# Patient Record
Sex: FEMALE | Race: BLACK OR AFRICAN AMERICAN
[De-identification: names, ages, dates, MRNs, and addresses within clinical notes are randomized per-mention and may not be internally consistent; named-entity substitution may affect disease eponyms.]

---

## 2017-03-20 ENCOUNTER — HOSPITAL ENCOUNTER (EMERGENCY)
Dept: HOSPITAL 74 - JER | Age: 52
Discharge: HOME | End: 2017-03-20
Payer: COMMERCIAL

## 2017-03-20 VITALS — DIASTOLIC BLOOD PRESSURE: 106 MMHG | SYSTOLIC BLOOD PRESSURE: 151 MMHG | HEART RATE: 104 BPM

## 2017-03-20 VITALS — BODY MASS INDEX: 30.2 KG/M2

## 2017-03-20 VITALS — TEMPERATURE: 97.8 F

## 2017-03-20 DIAGNOSIS — J45.901: Primary | ICD-10-CM

## 2017-03-20 LAB
ANION GAP SERPL CALC-SCNC: 8 MMOL/L (ref 8–16)
APPEARANCE UR: CLEAR
BASOPHILS # BLD: 1.1 % (ref 0–2)
BILIRUB UR STRIP.AUTO-MCNC: NEGATIVE MG/DL
CALCIUM SERPL-MCNC: 8.3 MG/DL (ref 8.5–10.1)
CO2 SERPL-SCNC: 24 MMOL/L (ref 21–32)
COLOR UR: YELLOW
CREAT SERPL-MCNC: 0.6 MG/DL (ref 0.55–1.02)
DEPRECATED RDW RBC AUTO: 15.1 % (ref 11.6–15.6)
EOSINOPHIL # BLD: 5.3 % (ref 0–4.5)
GLUCOSE SERPL-MCNC: 94 MG/DL (ref 74–106)
KETONES UR QL STRIP: NEGATIVE
LEUKOCYTE ESTERASE UR QL STRIP.AUTO: NEGATIVE
MAGNESIUM SERPL-MCNC: 1.9 MG/DL (ref 1.8–2.4)
MCH RBC QN AUTO: 26.4 PG (ref 25.7–33.7)
MCHC RBC AUTO-ENTMCNC: 32.5 G/DL (ref 32–36)
MCV RBC: 81.2 FL (ref 80–96)
MUCOUS THREADS URNS QL MICRO: (no result)
NEUTROPHILS # BLD: 38.8 % (ref 42.8–82.8)
NITRITE UR QL STRIP: NEGATIVE
PH UR: 5 [PH] (ref 5–8)
PHOSPHATE SERPL-MCNC: 3.5 MG/DL (ref 2.5–4.9)
PLATELET # BLD AUTO: 293 K/MM3 (ref 134–434)
PMV BLD: 8.4 FL (ref 7.5–11.1)
PROT UR QL STRIP: (no result)
PROT UR QL STRIP: NEGATIVE
RBC # BLD AUTO: 2 /HPF (ref 0–3)
RBC # UR STRIP: NEGATIVE /UL
SP GR UR: 1.02 (ref 1–1.03)
TROPONIN I SERPL-MCNC: < 0.02 NG/ML (ref 0–0.05)
UROBILINOGEN UR STRIP-MCNC: NEGATIVE E.U./DL (ref 0.2–1)
WBC # BLD AUTO: 7.6 K/MM3 (ref 4–10)
WBC # UR AUTO: 1 /HPF (ref 3–5)

## 2017-03-20 NOTE — PDOC
History of Present Illness





- General


History Source: Patient, Old Records


Exam Limitations: No Limitations





<Ning Mendez - Last Filed: 03/20/17 10:39>





- General


History Source: Patient


Exam Limitations: No Limitations





- History of Present Illness


Initial Comments: 


03/20/17 08:57


The patient is a 51-year-old woman with a significant past medical history of 

asthma (requiting past intubation; last when she was age of 16; last 

hospitalization for asthma was approximately 3 years ago; no history of CPAP or 

BiPAP usage) who presents to the emergency department via walk-in for further 

evaluation of  shortness of breath. Patient was noted to be 100 % on room air 

with a respiration rate of 34 and hear rate of 104. She was immediately placed 

on a Nebulizer treatment (overall 2 treatments) which provided her significant 

relief. As per patient, her symptoms started approximately 2 weeks ago, when 

she went to her PMD's office for a non-productive cough. She was ultimately 

placed on Z-RENEE and tapering Prednisone. She completed this and felt much 

better. However, she reports feeing increasingly short of breath for the past 

week. She reports endorsing an intermittent productive cough with yellow phlegm 

with associated chills and shortness of breath. She reports using her machine 

at home, all week, which provided little-no relief. She also reports an episode 

of emesis, but states that this is secondary to her persistent coughing. She 

also reports associated chest pain and headache, but attributes this to her 

typical asthmatic symptoms. 





No fever, generalized weakness.


No rhinorrhea, nasal congestion, orthopnea.


No abdominal pain, diarrhea, constipation.


No lightheadedness, dizziness, visual changes, neck pain, jaw pain, leg pain/

swelling, calf tenderness.





Allergies: No Known Drug Allergies


Past Surgical History: Tummy tuck


Social History: No tobacco, ETOH and recreational drug use.





<Yessy Brock - Last Filed: 03/20/17 10:47>





- General


Chief Complaint: Asthma


Stated Complaint: WHEEZING, COUGH (ASTHMA)


Time Seen by Provider: 03/20/17 08:57





Past History





- Past Medical History


Asthma: Yes (INTUBATED)


Suicide Attempt (Hx): No





- Surgical History


Abdominal Surgery:  (TUMMY TUCK)





- Immunization History


Immunization Up to Date: Yes





- Psycho/Social/Smoking Cessation Hx


Anxiety: No


Suicidal Ideation: No


Smoking History: Never smoked


Have you smoked in the past 12 months: No


Number of Cigarettes Smoked Daily: 0


Cigars Per Day: 0


Hx Alcohol Use: Yes (OCCASIONALLY)


Drug/Substance Use Hx: No


Substance Use Type: None





<MattcristinaNing - Last Filed: 03/20/17 10:39>





<Yessy Brock - Last Filed: 03/20/17 10:47>





- Past Medical History


Allergies/Adverse Reactions: 


 Allergies











Allergy/AdvReac Type Severity Reaction Status Date / Time


 


No Known Allergies Allergy   Verified 03/20/17 08:48











Home Medications: 


Ambulatory Orders





Albuterol 0.083% Nebulizer Sol [Ventolin 0.083%] 1 neb NEB QID PRN 03/20/17 


Albuterol Sulfate Inhaler - [Ventolin HFA Inhaler -] 2 inh PO Q4H #1 inh 03/20/ 17 


Albuterol Sulfate Inhaler - [Ventolin Hfa Inhaler -] 1 - 2 inh PO QID PRN 03/20/ 17 


Prednisone [Deltasone -] 40 mg PO UTDICT #8 tablet 03/20/17 


Prednisone [Deltasone -] 40 mg PO UTDICT 4 Days 03/20/17 











**Review of Systems





- Review of Systems


Able to Perform ROS?: Yes


Comments:: 


03/20/17 08:57


GENERAL/CONSTITUTIONAL: No fever or chills. No weakness.


HEAD, EYES, EARS, NOSE AND THROAT: No change in vision. No ear pain or 

discharge. No sore throat.


CARDIOVASCULAR: Yes: Chest Pain. Shortness of Breath. 


RESPIRATORY: Yes: Cough. Shortness of Breath No wheezing, or hemoptysis.


GASTROINTESTINAL: Yes: Nausea. Vomiting (1 episode).  No diarrhea or 

constipation.


GENITOURINARY: No dysuria, frequency, or change in urination.


MUSCULOSKELETAL: No joint or muscle swelling or pain. No neck or back pain.


SKIN: No rash


NEUROLOGIC: Yes: Headache. No vertigo, loss of consciousness, or change in 

strength/sensation.


ENDOCRINE: No increased thirst. No abnormal weight change.


HEMATOLOGIC/LYMPHATIC: No anemia, easy bleeding, or history of blood clots.


ALLERGIC/IMMUNOLOGIC: No hives or skin allergy.





<Yessy Brock - Last Filed: 03/20/17 10:47>





*Physical Exam





- Vital Signs


 Last Vital Signs











Temp Pulse Resp BP Pulse Ox


 


    104 H  34 H  151/106   98 


 


    03/20/17 08:44  03/20/17 08:44  03/20/17 08:44  03/20/17 08:44














<AndreaNing - Last Filed: 03/20/17 10:39>





- Vital Signs


 Last Vital Signs











Temp Pulse Resp BP Pulse Ox


 


    104 H  34 H  151/106   98 


 


    03/20/17 08:44  03/20/17 08:44  03/20/17 08:44  03/20/17 08:44














- Physical Exam


Comments: 


03/20/17 08:57


GENERAL: Awake, alert, and fully oriented, in no acute distress 


HEAD: No signs of trauma


EYES: PERRLA, EOMI, sclera anicteric, conjunctiva clear


ENT: Auricles normal inspection, hearing grossly normal, nares patent, 

oropharynx clear without exudates. Moist mucosa


NECK: Normal ROM, supple, no lymphadenopathy, JVD, or masses


LUNGS: Fair to good air movement. No accessory muscle use. Breath sounds are 

clear to auscultation, bilaterally. No expiratory wheezes appreciated.  No 

crackles.


HEART: Regular rate and rhythm, normal S1 and S2, no murmurs, rubs or gallops


ABDOMEN: Soft, nontender, normoactive bowel sounds.  No guarding, no rebound.  

No masses


EXTREMITIES: Normal range of motion, no edema.  No clubbing or cyanosis. No 

cords, erythema, or tenderness


NEUROLOGICAL: Cranial nerves II through XII grossly intact.  Normal speech, 

normal gait








<Yessy Brock - Last Filed: 03/20/17 10:47>





ED Treatment Course





- LABORATORY


CBC & Chemistry Diagram: 


 03/20/17 09:10





 03/20/17 09:10





<Ning Mendez - Last Filed: 03/20/17 10:39>





- LABORATORY


CBC & Chemistry Diagram: 


 03/20/17 09:10





 03/20/17 09:10





- ADDITIONAL ORDERS


Additional order review: 


 Laboratory  Results











  03/20/17





  09:10


 


Magnesium  Cancelled














- RADIOLOGY


Radiograph Interpretation: 


03/20/17 10:05


EXAM: RAD/CHEST X-RAY PORTABLE


IMPRESSION: Frontal view of the chest is provided. Lung fields appear clear 

without evidence of infiltrate or effusion. Cardiomediastinal silhouette is 

within normal limits. Visualized bony structures appear intact. 





<Yessy Brock - Last Filed: 03/20/17 10:47>





Medical Decision Making





- Medical Decision Making


03/20/17 09:08


51-year-old female with history of asthmaintubated times one as a child 

presents the emergency department with shortness of breath 1 week unrelieved 

with nebulizer treatments at home and cough productive with yellow sputum. The 

patient is saturating well and is able to speak in full sentences. Differential 

diagnosis includes but is not limited to: Asthmatic exacerbation, ACS, influenza

, URI, bronchitis.


Plan:


1. Labs


2. Chest x-ray


3. EKG


4. DuoNeb treatment


5. Observe and reevaluate





03/20/17 10:28


Addendum: The labs were reviewed and are noted in the EMR. Chest x-ray is 

negative and all labs are within normal limits. Influenza PCR is negative. EKG 

shows normal sinus rhythm with no acute ST segment changes and normal axis and 

intervals. I have reevaluated the patient at this time and she is feeling 

improved and continues to saturate 100% on room air. I have prescribed the 

patient prednisone 40 mg for the next 4 days and have refilled her prescription 

for the albuterol metered-dose inhaler. I have advised the patient to follow-up 

with her primary care physician and return to the emergency department if her 

symptoms persist, worsen, or new symptoms arise.





<Ning Mendez - Last Filed: 03/20/17 10:39>





*DC/Admit/Observation/Transfer





- Discharge Dispostion


Admit: No





- Attestations


Physician Attestion: 





03/20/17 09:13


I, Dr. Ning Mendez, attest that the scribes documentation that appears above 

has been prepared under my direction and personally reviewed by me in its 

entirety.





I confirmed that the note above accurately reflects all work, treatment, 

procedures, and medical decision-making performed by me.





<Ning Mendez - Last Filed: 03/20/17 10:39>





- Attestations


Scribe Attestion: 


03/20/17 09:19


Documentation prepared by Yessy Brock, acting as medical scribe for 

Ning Mendez MD.





<Yessy Brock - Last Filed: 03/20/17 10:47>


Diagnosis at time of Disposition: 


 Shortness of breath, Chronic asthmatic bronchitis with acute exacerbation





- Discharge Dispostion


Disposition: HOME


Condition at time of disposition: Stable





- Prescriptions


Prescriptions: 


Prednisone [Deltasone -] 40 mg PO UTDICT 4 Days


Prednisone [Deltasone -] 40 mg PO UTDICT #8 tablet


Albuterol Sulfate Inhaler - [Ventolin HFA Inhaler -] 2 inh PO Q4H #1 inh





- Patient Instructions


Printed Discharge Instructions:  Asthma -- Adult


Additional Instructions: 


You're being prescribed prednisone 40 mg daily for the next 4 days. You 

received a dose in the emergency department today. Your also been prescribed an 

albuterol metered-dose inhaler. Please follow-up with your primary care 

physician this week and return to the emergency department if your symptoms 

persist, worsen, or new symptoms arise.





- Post Discharge Activity


Work/School Note:  Back to Work

## 2017-03-21 NOTE — EKG
Test Reason : 

Blood Pressure : ***/*** mmHG

Vent. Rate : 078 BPM     Atrial Rate : 078 BPM

   P-R Int : 182 ms          QRS Dur : 078 ms

    QT Int : 416 ms       P-R-T Axes : 044 -07 010 degrees

   QTc Int : 474 ms

 

NORMAL SINUS RHYTHM

LOW VOLTAGE QRS

ABNORMAL ECG

WHEN COMPARED WITH ECG OF 13-MAY-2015 10:16,

[place] INTERVAL HAS DECREASED

Confirmed by CARLOS GOLD MD ([id number]) on 3/21/2017 5:10:12 PM

 

Referred By:             Confirmed By:CARLOS GOLD MD

## 2017-06-11 ENCOUNTER — HOSPITAL ENCOUNTER (EMERGENCY)
Dept: HOSPITAL 74 - JER | Age: 52
Discharge: HOME | End: 2017-06-11
Payer: COMMERCIAL

## 2017-06-11 VITALS — DIASTOLIC BLOOD PRESSURE: 59 MMHG | HEART RATE: 77 BPM | SYSTOLIC BLOOD PRESSURE: 91 MMHG

## 2017-06-11 VITALS — BODY MASS INDEX: 29.3 KG/M2

## 2017-06-11 VITALS — TEMPERATURE: 98.2 F

## 2017-06-11 DIAGNOSIS — J45.909: ICD-10-CM

## 2017-06-11 DIAGNOSIS — G44.52: Primary | ICD-10-CM

## 2017-06-11 DIAGNOSIS — D64.9: ICD-10-CM

## 2017-06-11 LAB
ALBUMIN SERPL-MCNC: 3.4 G/DL (ref 3.4–5)
ALP SERPL-CCNC: 78 U/L (ref 45–117)
ALT SERPL-CCNC: 16 U/L (ref 12–78)
ANION GAP SERPL CALC-SCNC: 10 MMOL/L (ref 8–16)
APPEARANCE UR: CLEAR
AST SERPL-CCNC: 14 U/L (ref 15–37)
BASOPHILS # BLD: 0.7 % (ref 0–2)
BILIRUB SERPL-MCNC: 0.4 MG/DL (ref 0.2–1)
BILIRUB UR STRIP.AUTO-MCNC: NEGATIVE MG/DL
CALCIUM SERPL-MCNC: 8.9 MG/DL (ref 8.5–10.1)
CO2 SERPL-SCNC: 24 MMOL/L (ref 21–32)
COLOR UR: YELLOW
CREAT SERPL-MCNC: 0.8 MG/DL (ref 0.55–1.02)
DEPRECATED RDW RBC AUTO: 14.8 % (ref 11.6–15.6)
EOSINOPHIL # BLD: 4.1 % (ref 0–4.5)
GLUCOSE SERPL-MCNC: 118 MG/DL (ref 74–106)
KETONES UR QL STRIP: NEGATIVE
LEUKOCYTE ESTERASE UR QL STRIP.AUTO: NEGATIVE
MCH RBC QN AUTO: 26.6 PG (ref 25.7–33.7)
MCHC RBC AUTO-ENTMCNC: 32.5 G/DL (ref 32–36)
MCV RBC: 81.8 FL (ref 80–96)
NEUTROPHILS # BLD: 48.2 % (ref 42.8–82.8)
NITRITE UR QL STRIP: NEGATIVE
PH UR: 5 [PH] (ref 5–8)
PLATELET # BLD AUTO: 261 K/MM3 (ref 134–434)
PMV BLD: 8.4 FL (ref 7.5–11.1)
PROT SERPL-MCNC: 6.9 G/DL (ref 6.4–8.2)
PROT UR QL STRIP: NEGATIVE
PROT UR QL STRIP: NEGATIVE
RBC # UR STRIP: NEGATIVE /UL
SP GR UR: >= 1.03 (ref 1–1.02)
UROBILINOGEN UR STRIP-MCNC: NEGATIVE E.U./DL (ref 0.2–1)
WBC # BLD AUTO: 8.4 K/MM3 (ref 4–10)

## 2017-06-11 PROCEDURE — 3E033GC INTRODUCTION OF OTHER THERAPEUTIC SUBSTANCE INTO PERIPHERAL VEIN, PERCUTANEOUS APPROACH: ICD-10-PCS

## 2018-01-08 ENCOUNTER — HOSPITAL ENCOUNTER (INPATIENT)
Dept: HOSPITAL 74 - JER | Age: 53
LOS: 2 days | Discharge: HOME | DRG: 880 | End: 2018-01-10
Attending: INTERNAL MEDICINE | Admitting: INTERNAL MEDICINE
Payer: COMMERCIAL

## 2018-01-08 VITALS — BODY MASS INDEX: 30.5 KG/M2

## 2018-01-08 DIAGNOSIS — F41.8: ICD-10-CM

## 2018-01-08 DIAGNOSIS — J45.909: ICD-10-CM

## 2018-01-08 DIAGNOSIS — G47.19: ICD-10-CM

## 2018-01-08 DIAGNOSIS — R45.851: Primary | ICD-10-CM

## 2018-01-08 DIAGNOSIS — G43.809: ICD-10-CM

## 2018-01-08 DIAGNOSIS — D64.9: ICD-10-CM

## 2018-01-08 LAB
ALBUMIN SERPL-MCNC: 3.4 G/DL (ref 3.4–5)
ALP SERPL-CCNC: 88 U/L (ref 45–117)
ALT SERPL-CCNC: 22 U/L (ref 12–78)
AMPHET UR-MCNC: NEGATIVE NG/ML
ANION GAP SERPL CALC-SCNC: 6 MMOL/L (ref 8–16)
AST SERPL-CCNC: 10 U/L (ref 15–37)
BARBITURATES UR-MCNC: NEGATIVE NG/ML
BASOPHILS # BLD: 0.7 % (ref 0–2)
BENZODIAZ UR SCN-MCNC: NEGATIVE NG/ML
BILIRUB SERPL-MCNC: 0.3 MG/DL (ref 0.2–1)
BUN SERPL-MCNC: 14 MG/DL (ref 7–18)
CALCIUM SERPL-MCNC: 8.7 MG/DL (ref 8.5–10.1)
CHLORIDE SERPL-SCNC: 107 MMOL/L (ref 98–107)
CO2 SERPL-SCNC: 26 MMOL/L (ref 21–32)
COCAINE UR-MCNC: NEGATIVE NG/ML
CREAT SERPL-MCNC: 0.8 MG/DL (ref 0.55–1.02)
DEPRECATED RDW RBC AUTO: 15.1 % (ref 11.6–15.6)
EOSINOPHIL # BLD: 3.7 % (ref 0–4.5)
GLUCOSE SERPL-MCNC: 85 MG/DL (ref 74–106)
HCT VFR BLD CALC: 32.2 % (ref 32.4–45.2)
HGB BLD-MCNC: 10.5 GM/DL (ref 10.7–15.3)
LYMPHOCYTES # BLD: 43.2 % (ref 8–40)
MCH RBC QN AUTO: 26.4 PG (ref 25.7–33.7)
MCHC RBC AUTO-ENTMCNC: 32.8 G/DL (ref 32–36)
MCV RBC: 80.4 FL (ref 80–96)
METHADONE UR-MCNC: NEGATIVE NG/ML
MONOCYTES # BLD AUTO: 7.5 % (ref 3.8–10.2)
NEUTROPHILS # BLD: 44.9 % (ref 42.8–82.8)
OPIATES UR QL SCN: NEGATIVE NG/ML
PCP UR QL SCN: NEGATIVE NG/ML
PLATELET # BLD AUTO: 378 K/MM3 (ref 134–434)
PMV BLD: 8.2 FL (ref 7.5–11.1)
POTASSIUM SERPLBLD-SCNC: 4.2 MMOL/L (ref 3.5–5.1)
PROT SERPL-MCNC: 7.3 G/DL (ref 6.4–8.2)
RBC # BLD AUTO: 4 M/MM3 (ref 3.6–5.2)
SODIUM SERPL-SCNC: 139 MMOL/L (ref 136–145)
WBC # BLD AUTO: 9.1 K/MM3 (ref 4–10)

## 2018-01-08 PROCEDURE — G0378 HOSPITAL OBSERVATION PER HR: HCPCS

## 2018-01-09 VITALS — TEMPERATURE: 97.6 F

## 2018-01-10 VITALS — DIASTOLIC BLOOD PRESSURE: 51 MMHG | SYSTOLIC BLOOD PRESSURE: 124 MMHG | HEART RATE: 103 BPM

## 2018-01-10 LAB
APPEARANCE UR: (no result)
BILIRUB UR STRIP.AUTO-MCNC: NEGATIVE MG/DL
COLOR UR: (no result)
KETONES UR QL STRIP: NEGATIVE
LEUKOCYTE ESTERASE UR QL STRIP.AUTO: NEGATIVE
NITRITE UR QL STRIP: NEGATIVE
PH UR: 6 [PH] (ref 5–8)
PROT UR QL STRIP: NEGATIVE
PROT UR QL STRIP: NEGATIVE
RBC # UR STRIP: NEGATIVE /UL
SERUM IRON SATURATION: 8 % (ref 15–55)
SP GR UR: 1.02 (ref 1–1.03)
TIBC SERPL-MCNC: 322 UG/DL (ref 250–450)
UIBC SERPL-MCNC: 295 UG/DL (ref 131–425)
UROBILINOGEN UR STRIP-MCNC: NEGATIVE MG/DL (ref 0.2–1)

## 2018-06-06 ENCOUNTER — HOSPITAL ENCOUNTER (EMERGENCY)
Dept: HOSPITAL 74 - JERFT | Age: 53
Discharge: HOME | End: 2018-06-06
Payer: COMMERCIAL

## 2018-06-06 VITALS — DIASTOLIC BLOOD PRESSURE: 76 MMHG | HEART RATE: 100 BPM | TEMPERATURE: 98 F | SYSTOLIC BLOOD PRESSURE: 130 MMHG

## 2018-06-06 VITALS — BODY MASS INDEX: 32.4 KG/M2

## 2018-06-06 DIAGNOSIS — J45.21: Primary | ICD-10-CM

## 2018-12-27 ENCOUNTER — HOSPITAL ENCOUNTER (EMERGENCY)
Dept: HOSPITAL 74 - JER | Age: 53
Discharge: HOME | End: 2018-12-27
Payer: COMMERCIAL

## 2018-12-27 VITALS — SYSTOLIC BLOOD PRESSURE: 126 MMHG | HEART RATE: 78 BPM | DIASTOLIC BLOOD PRESSURE: 72 MMHG | TEMPERATURE: 97.9 F

## 2018-12-27 VITALS — BODY MASS INDEX: 32.4 KG/M2

## 2018-12-27 DIAGNOSIS — F32.9: ICD-10-CM

## 2018-12-27 DIAGNOSIS — J45.909: ICD-10-CM

## 2018-12-27 DIAGNOSIS — Z87.39: ICD-10-CM

## 2018-12-27 DIAGNOSIS — D64.9: ICD-10-CM

## 2018-12-27 DIAGNOSIS — M79.652: Primary | ICD-10-CM

## 2018-12-27 DIAGNOSIS — F41.8: ICD-10-CM

## 2019-02-19 ENCOUNTER — HOSPITAL ENCOUNTER (OUTPATIENT)
Dept: HOSPITAL 74 - JASU-SURG | Age: 54
Discharge: HOME | End: 2019-02-19
Attending: OBSTETRICS & GYNECOLOGY
Payer: COMMERCIAL

## 2019-02-19 VITALS — BODY MASS INDEX: 30.9 KG/M2

## 2019-02-19 DIAGNOSIS — Z53.8: Primary | ICD-10-CM

## 2019-03-12 ENCOUNTER — HOSPITAL ENCOUNTER (OUTPATIENT)
Dept: HOSPITAL 74 - JASU-SURG | Age: 54
Discharge: HOME | End: 2019-03-12
Attending: OBSTETRICS & GYNECOLOGY
Payer: COMMERCIAL

## 2019-03-12 VITALS — DIASTOLIC BLOOD PRESSURE: 75 MMHG | HEART RATE: 94 BPM | SYSTOLIC BLOOD PRESSURE: 124 MMHG

## 2019-03-12 VITALS — TEMPERATURE: 97.6 F

## 2019-03-12 VITALS — BODY MASS INDEX: 34.3 KG/M2

## 2019-03-12 DIAGNOSIS — N92.1: Primary | ICD-10-CM

## 2019-03-12 DIAGNOSIS — D25.0: ICD-10-CM

## 2019-03-12 PROCEDURE — 0UB98ZZ EXCISION OF UTERUS, VIA NATURAL OR ARTIFICIAL OPENING ENDOSCOPIC: ICD-10-PCS | Performed by: OBSTETRICS & GYNECOLOGY

## 2019-03-12 PROCEDURE — 0UJD8ZZ INSPECTION OF UTERUS AND CERVIX, VIA NATURAL OR ARTIFICIAL OPENING ENDOSCOPIC: ICD-10-PCS | Performed by: OBSTETRICS & GYNECOLOGY

## 2019-03-12 PROCEDURE — 0UDB7ZX EXTRACTION OF ENDOMETRIUM, VIA NATURAL OR ARTIFICIAL OPENING, DIAGNOSTIC: ICD-10-PCS | Performed by: OBSTETRICS & GYNECOLOGY

## 2019-05-23 ENCOUNTER — HOSPITAL ENCOUNTER (EMERGENCY)
Dept: HOSPITAL 74 - JER | Age: 54
LOS: 1 days | Discharge: HOME | End: 2019-05-24
Payer: SELF-PAY

## 2019-05-23 VITALS — BODY MASS INDEX: 34.8 KG/M2

## 2019-05-23 DIAGNOSIS — D25.9: Primary | ICD-10-CM

## 2019-05-23 PROCEDURE — 3E0333Z INTRODUCTION OF ANTI-INFLAMMATORY INTO PERIPHERAL VEIN, PERCUTANEOUS APPROACH: ICD-10-PCS

## 2019-05-24 VITALS — TEMPERATURE: 98.1 F | HEART RATE: 78 BPM | DIASTOLIC BLOOD PRESSURE: 78 MMHG | SYSTOLIC BLOOD PRESSURE: 136 MMHG

## 2019-05-24 LAB
ALBUMIN SERPL-MCNC: 3.6 G/DL (ref 3.4–5)
ALP SERPL-CCNC: 101 U/L (ref 45–117)
ALT SERPL-CCNC: 18 U/L (ref 13–61)
ANION GAP SERPL CALC-SCNC: 7 MMOL/L (ref 8–16)
APPEARANCE UR: CLEAR
AST SERPL-CCNC: 11 U/L (ref 15–37)
BASOPHILS # BLD: 0.8 % (ref 0–2)
BILIRUB SERPL-MCNC: 0.2 MG/DL (ref 0.2–1)
BILIRUB UR STRIP.AUTO-MCNC: NEGATIVE MG/DL
BUN SERPL-MCNC: 19 MG/DL (ref 7–18)
CALCIUM SERPL-MCNC: 9.1 MG/DL (ref 8.5–10.1)
CHLORIDE SERPL-SCNC: 108 MMOL/L (ref 98–107)
CO2 SERPL-SCNC: 25 MMOL/L (ref 21–32)
COLOR UR: YELLOW
CREAT SERPL-MCNC: 0.8 MG/DL (ref 0.55–1.3)
DEPRECATED RDW RBC AUTO: 16.8 % (ref 11.6–15.6)
EOSINOPHIL # BLD: 5.3 % (ref 0–4.5)
GLUCOSE SERPL-MCNC: 93 MG/DL (ref 74–106)
HCT VFR BLD CALC: 33.8 % (ref 32.4–45.2)
HGB BLD-MCNC: 10.6 GM/DL (ref 10.7–15.3)
KETONES UR QL STRIP: NEGATIVE
LEUKOCYTE ESTERASE UR QL STRIP.AUTO: NEGATIVE
LYMPHOCYTES # BLD: 48.9 % (ref 8–40)
MCH RBC QN AUTO: 25.3 PG (ref 25.7–33.7)
MCHC RBC AUTO-ENTMCNC: 31.4 G/DL (ref 32–36)
MCV RBC: 80.8 FL (ref 80–96)
MONOCYTES # BLD AUTO: 6.3 % (ref 3.8–10.2)
NEUTROPHILS # BLD: 38.7 % (ref 42.8–82.8)
NITRITE UR QL STRIP: NEGATIVE
PH UR: 6.5 [PH] (ref 5–8)
PLATELET # BLD AUTO: 330 K/MM3 (ref 134–434)
PMV BLD: 8.6 FL (ref 7.5–11.1)
POTASSIUM SERPLBLD-SCNC: 4.2 MMOL/L (ref 3.5–5.1)
PROT SERPL-MCNC: 7.4 G/DL (ref 6.4–8.2)
PROT UR QL STRIP: NEGATIVE
PROT UR QL STRIP: NEGATIVE
RBC # BLD AUTO: 4.18 M/MM3 (ref 3.6–5.2)
SODIUM SERPL-SCNC: 140 MMOL/L (ref 136–145)
SP GR UR: 1.03 (ref 1.01–1.03)
UROBILINOGEN UR STRIP-MCNC: 1 MG/DL (ref 0.2–1)
WBC # BLD AUTO: 9.3 K/MM3 (ref 4–10)

## 2019-05-25 ENCOUNTER — HOSPITAL ENCOUNTER (EMERGENCY)
Dept: HOSPITAL 74 - JER | Age: 54
Discharge: HOME | End: 2019-05-25
Payer: COMMERCIAL

## 2021-10-18 ENCOUNTER — HOSPITAL ENCOUNTER (OUTPATIENT)
Dept: HOSPITAL 74 - JASU-SURG | Age: 56
Discharge: HOME | End: 2021-10-18
Attending: OBSTETRICS & GYNECOLOGY
Payer: COMMERCIAL

## 2021-10-18 VITALS — TEMPERATURE: 97.3 F | HEART RATE: 69 BPM | DIASTOLIC BLOOD PRESSURE: 78 MMHG | SYSTOLIC BLOOD PRESSURE: 139 MMHG

## 2021-10-18 VITALS — BODY MASS INDEX: 35.9 KG/M2

## 2021-10-18 DIAGNOSIS — D25.9: ICD-10-CM

## 2021-10-18 DIAGNOSIS — N92.1: Primary | ICD-10-CM

## 2021-10-18 PROCEDURE — 0UB98ZZ EXCISION OF UTERUS, VIA NATURAL OR ARTIFICIAL OPENING ENDOSCOPIC: ICD-10-PCS | Performed by: OBSTETRICS & GYNECOLOGY

## 2021-10-18 PROCEDURE — 0UDB8ZX EXTRACTION OF ENDOMETRIUM, VIA NATURAL OR ARTIFICIAL OPENING ENDOSCOPIC, DIAGNOSTIC: ICD-10-PCS | Performed by: OBSTETRICS & GYNECOLOGY
